# Patient Record
Sex: FEMALE | Race: BLACK OR AFRICAN AMERICAN | Employment: PART TIME | URBAN - METROPOLITAN AREA
[De-identification: names, ages, dates, MRNs, and addresses within clinical notes are randomized per-mention and may not be internally consistent; named-entity substitution may affect disease eponyms.]

---

## 2019-04-09 ENCOUNTER — TRANSFERRED RECORDS (OUTPATIENT)
Dept: HEALTH INFORMATION MANAGEMENT | Facility: CLINIC | Age: 24
End: 2019-04-09

## 2019-07-02 ENCOUNTER — TRANSFERRED RECORDS (OUTPATIENT)
Dept: HEALTH INFORMATION MANAGEMENT | Facility: CLINIC | Age: 24
End: 2019-07-02

## 2019-07-05 ENCOUNTER — HOSPITAL ENCOUNTER (OUTPATIENT)
Facility: HOSPITAL | Age: 24
End: 2019-07-05
Payer: COMMERCIAL

## 2019-07-05 ENCOUNTER — TELEPHONE (OUTPATIENT)
Dept: OBGYN | Facility: OTHER | Age: 24
End: 2019-07-05

## 2019-07-05 ENCOUNTER — HOSPITAL ENCOUNTER (OUTPATIENT)
Facility: HOSPITAL | Age: 24
Discharge: HOME | End: 2019-07-05
Attending: OBSTETRICS & GYNECOLOGY | Admitting: OBSTETRICS & GYNECOLOGY
Payer: COMMERCIAL

## 2019-07-05 VITALS — RESPIRATION RATE: 16 BRPM | TEMPERATURE: 98.4 F | DIASTOLIC BLOOD PRESSURE: 65 MMHG | SYSTOLIC BLOOD PRESSURE: 109 MMHG

## 2019-07-05 PROBLEM — Z36.89 ENCOUNTER FOR TRIAGE IN PREGNANT PATIENT: Status: ACTIVE | Noted: 2019-07-05

## 2019-07-05 PROCEDURE — G0463 HOSPITAL OUTPT CLINIC VISIT: HCPCS | Mod: 25

## 2019-07-05 PROCEDURE — 59025 FETAL NON-STRESS TEST: CPT

## 2019-07-05 PROCEDURE — 59025 FETAL NON-STRESS TEST: CPT | Mod: 26 | Performed by: OBSTETRICS & GYNECOLOGY

## 2019-07-05 RX ORDER — FERROUS SULFATE 325(65) MG
325 TABLET ORAL
COMMUNITY

## 2019-07-05 RX ORDER — PRENATAL VIT/IRON FUM/FOLIC AC 27MG-0.8MG
1 TABLET ORAL DAILY
COMMUNITY

## 2019-07-05 RX ORDER — DOCUSATE SODIUM 100 MG/1
100 CAPSULE, LIQUID FILLED ORAL 2 TIMES DAILY
COMMUNITY

## 2019-07-05 NOTE — TELEPHONE ENCOUNTER
Patient is 37 weeks pregnant and [place]. Needs to establish care with an OB Dr.  Please call her. If you cannot reach her at her phone, please call friend Radha listed in cell phone area.

## 2019-07-05 NOTE — PLAN OF CARE
OB Triage Note  maryuri Ngo  MRN: [id number]  Gestational Age: 37w2d      maryuri Ngo presents for decreased fetal movement  (sign/symptom/concern).      Denies contractions. Rates pain at 0/10.  Bleeding: no bleeding.  Denies LOF.     Dr. Ling notified of arrival and condition.  Oriented patient to surroundings. Call light within reach.     FHT: 125  NST Start Time:0240  NST Stop Time:0300  NST: Reactive.  Uterine Assessment:Contractions: frequency q 3-13 minutes of not felt by patient and not palpable quality.    Plan:  -Initial NST, then fetal/uterine monitoring per MD/patient plan.  -Sterile vaginal exam/sterile speculum exam.  -Nursing education on s/sx of labor and when to return to hospital provided.

## 2019-07-05 NOTE — PLAN OF CARE
maryuri Ngo is a 24 year old  and 37w2d patient came in complaining of Decreased Fetal Movement (decreased fetal movement for more than 2 hours )    Patient Active Problem List   Diagnosis     Encounter for triage in pregnant patient       Pt discharged to home at 0337 AM and encouraged to rest and drink plenty of fluids.  Pt told to call/return if bleeding more than spotting, water breaks, contractions 3-5 minutes apart that she has to breath through.     Nursing education on s/sx of labor and when she should return to hospital provided. Self-management instructions reviewed. AVS given and signed. All questions answered and patient verbalizes understanding.    /65   Temp 98.4  F (36.9  C) (Oral)   Resp 16   LMP 10/17/2018     Cervical status: not examined  Fetal Assessment: Reactive    Discharge support:  Family/Friend Support    OB History    Para Term  AB Living   4 2 2 0 1 0   SAB TAB Ectopic Multiple Live Births   1 0 0 0 2      # Outcome Date GA Lbr Deon/2nd Weight Sex Delivery Anes PTL Lv   4 Current            3 SAB            2 Term            1 Term

## 2019-07-05 NOTE — DISCHARGE INSTRUCTIONS
Discharge Instructions for Undelivered Patients    Diet:  * Drink 8 to 12 glasses of liquids (milk, juice, water) every day  * You may eat meals and snacks.    Activity:  * Count fetal kicks every day.  * Call your doctor if your baby is moving less than usual.    Call your provider if you notice:  * Swelling in your face or increased swelling in your hands or legs.  * Headaches that are not relieved by Tylenol (acetaminophen).  * Changes in your vision (blurring; seeing spots or stars).  * Nausea (sick to your stomach) and vomiting (throwing up).  * Weight gain of 5 pounds per week.  * Heartburn that doesn't go away.  * Signs of bladder infection: Pain when you urinate (use the toilet), needing to go more often or more urgently.  * The bag of beltrán (membrane) breaks, or you notice leaking in your underwear.  * Bright red blood in your underwear.  * Abdominal (lower belly) or stomach pain.  * For first baby: Contractions (tightenings) less than 5 minutes apart for one hour or more.  * Second (plus) baby: Contractions (tightenings) less than 10 minutes apart and getting stronger.  * Increase or change in vaginal discharge (note the color and amount).    * Follow up either today or early next week to establish care.   [facility] [place]: [phone]    [facility]: [phone]

## 2019-07-06 ENCOUNTER — TRANSFERRED RECORDS (OUTPATIENT)
Dept: HEALTH INFORMATION MANAGEMENT | Facility: CLINIC | Age: 24
End: 2019-07-06

## 2019-07-10 ENCOUNTER — PRENATAL OFFICE VISIT (OUTPATIENT)
Dept: OBGYN | Facility: OTHER | Age: 24
End: 2019-07-10
Attending: OBSTETRICS & GYNECOLOGY
Payer: COMMERCIAL

## 2019-07-10 VITALS — SYSTOLIC BLOOD PRESSURE: 112 MMHG | DIASTOLIC BLOOD PRESSURE: 68 MMHG | WEIGHT: 214 LBS

## 2019-07-10 DIAGNOSIS — Z3A.38 38 WEEKS GESTATION OF PREGNANCY: ICD-10-CM

## 2019-07-10 DIAGNOSIS — Z13.1 SCREENING FOR DIABETES MELLITUS: Primary | ICD-10-CM

## 2019-07-10 LAB — GLUCOSE SERPL-MCNC: 83 MG/DL (ref 70–99)

## 2019-07-10 PROCEDURE — 36415 COLL VENOUS BLD VENIPUNCTURE: CPT | Mod: ZL | Performed by: OBSTETRICS & GYNECOLOGY

## 2019-07-10 PROCEDURE — G0463 HOSPITAL OUTPT CLINIC VISIT: HCPCS | Performed by: OBSTETRICS & GYNECOLOGY

## 2019-07-10 PROCEDURE — 99214 OFFICE O/P EST MOD 30 MIN: CPT | Performed by: OBSTETRICS & GYNECOLOGY

## 2019-07-10 PROCEDURE — 87653 STREP B DNA AMP PROBE: CPT | Mod: ZL | Performed by: OBSTETRICS & GYNECOLOGY

## 2019-07-10 PROCEDURE — 82947 ASSAY GLUCOSE BLOOD QUANT: CPT | Mod: ZL | Performed by: OBSTETRICS & GYNECOLOGY

## 2019-07-10 ASSESSMENT — PAIN SCALES - GENERAL: PAINLEVEL: NO PAIN (0)

## 2019-07-10 NOTE — PROGRESS NOTES
maryuri Ngo is a 24 year old  at 38 weeks who is transferring care from [place]. Pt states that pregnancy has been uncomplicated. She has a portion of her prenatal chart with her. Her basic labs were normal. The chart states that her dating was by first trimester ultrasound though the ultrasound reports are not included. The report from her glucola test is also not included and pt states that she refused.  She has a h/o 2 previous vaginal births     Past Medical History:   Diagnosis Date     NO ACTIVE PROBLEMS      Past Surgical History:   Procedure Laterality Date     NO HISTORY OF SURGERY        No Known Allergies    No family history on file.    EXAM  /68   Wt 97.1 kg (214 lb)   LMP 10/17/2018   Gen - NAD  Lungs - CTA  CV - RRR  Abdomen - gravid, non-tender  VE - 2 cm, 30%, vtx  Extremities - no edema    A/P Transfer of care at 38 0/7 weeks   1. Will try to get prenatal record with ultrasound reports (dating was by ultrasound)   2. Refused glucola test - pt states they did random testing, will check today   3. GBS done today   4. Labor precautions    AMANDO PEOPLES MD

## 2019-07-12 LAB
GP B STREP DNA SPEC QL NAA+PROBE: NEGATIVE
SPECIMEN SOURCE: NORMAL

## 2019-07-17 PROBLEM — O99.013 ANEMIA OF PREGNANCY IN THIRD TRIMESTER: Status: ACTIVE | Noted: 2019-07-17

## 2019-07-17 PROBLEM — Z34.93 PRENATAL CARE IN THIRD TRIMESTER: Status: ACTIVE | Noted: 2019-07-17
